# Patient Record
Sex: FEMALE | Race: WHITE | NOT HISPANIC OR LATINO
[De-identification: names, ages, dates, MRNs, and addresses within clinical notes are randomized per-mention and may not be internally consistent; named-entity substitution may affect disease eponyms.]

---

## 2017-04-04 ENCOUNTER — APPOINTMENT (OUTPATIENT)
Dept: MAMMOGRAPHY | Facility: IMAGING CENTER | Age: 42
End: 2017-04-04

## 2017-04-04 ENCOUNTER — OUTPATIENT (OUTPATIENT)
Dept: OUTPATIENT SERVICES | Facility: HOSPITAL | Age: 42
LOS: 1 days | End: 2017-04-04
Payer: COMMERCIAL

## 2017-04-04 ENCOUNTER — APPOINTMENT (OUTPATIENT)
Dept: ULTRASOUND IMAGING | Facility: IMAGING CENTER | Age: 42
End: 2017-04-04

## 2017-04-04 DIAGNOSIS — Z00.8 ENCOUNTER FOR OTHER GENERAL EXAMINATION: ICD-10-CM

## 2017-04-04 PROCEDURE — 76642 ULTRASOUND BREAST LIMITED: CPT

## 2017-05-10 ENCOUNTER — APPOINTMENT (OUTPATIENT)
Dept: NEUROLOGY | Facility: CLINIC | Age: 42
End: 2017-05-10

## 2017-05-10 VITALS
HEART RATE: 92 BPM | WEIGHT: 150 LBS | DIASTOLIC BLOOD PRESSURE: 62 MMHG | HEIGHT: 66 IN | BODY MASS INDEX: 24.11 KG/M2 | SYSTOLIC BLOOD PRESSURE: 106 MMHG

## 2017-05-10 DIAGNOSIS — M54.2 CERVICALGIA: ICD-10-CM

## 2017-05-10 DIAGNOSIS — Z78.9 OTHER SPECIFIED HEALTH STATUS: ICD-10-CM

## 2017-05-10 DIAGNOSIS — R20.2 PARESTHESIA OF SKIN: ICD-10-CM

## 2017-05-10 RX ORDER — PSYLLIUM HUSK 0.4 G
CAPSULE ORAL
Refills: 0 | Status: ACTIVE | COMMUNITY

## 2017-10-19 ENCOUNTER — LABORATORY RESULT (OUTPATIENT)
Age: 42
End: 2017-10-19

## 2017-10-19 ENCOUNTER — APPOINTMENT (OUTPATIENT)
Dept: ENDOCRINOLOGY | Facility: CLINIC | Age: 42
End: 2017-10-19
Payer: MEDICAID

## 2017-10-19 VITALS
OXYGEN SATURATION: 98 % | SYSTOLIC BLOOD PRESSURE: 102 MMHG | DIASTOLIC BLOOD PRESSURE: 72 MMHG | HEART RATE: 99 BPM | HEIGHT: 66 IN | BODY MASS INDEX: 20.73 KG/M2 | WEIGHT: 129 LBS

## 2017-10-19 PROCEDURE — 99214 OFFICE O/P EST MOD 30 MIN: CPT

## 2017-10-20 LAB
T3RU NFR SERPL: 0.94 INDEX
T4 SERPL-MCNC: 11.1 UG/DL
THYROGLOB AB SERPL-ACNC: <20 IU/ML
THYROGLOB SERPL-MCNC: <0.2 NG/ML
TSH SERPL-ACNC: 0.02 UIU/ML

## 2017-10-26 ENCOUNTER — RX RENEWAL (OUTPATIENT)
Age: 42
End: 2017-10-26

## 2018-11-19 ENCOUNTER — RX RENEWAL (OUTPATIENT)
Age: 43
End: 2018-11-19

## 2019-02-15 ENCOUNTER — RX RENEWAL (OUTPATIENT)
Age: 44
End: 2019-02-15

## 2019-03-07 ENCOUNTER — APPOINTMENT (OUTPATIENT)
Dept: ENDOCRINOLOGY | Facility: CLINIC | Age: 44
End: 2019-03-07

## 2019-08-12 ENCOUNTER — RX RENEWAL (OUTPATIENT)
Age: 44
End: 2019-08-12

## 2019-08-15 ENCOUNTER — RX RENEWAL (OUTPATIENT)
Age: 44
End: 2019-08-15

## 2019-08-16 ENCOUNTER — RX RENEWAL (OUTPATIENT)
Age: 44
End: 2019-08-16

## 2020-02-12 ENCOUNTER — RX RENEWAL (OUTPATIENT)
Age: 45
End: 2020-02-12

## 2020-02-21 ENCOUNTER — LABORATORY RESULT (OUTPATIENT)
Age: 45
End: 2020-02-21

## 2020-02-21 ENCOUNTER — APPOINTMENT (OUTPATIENT)
Dept: ENDOCRINOLOGY | Facility: CLINIC | Age: 45
End: 2020-02-21
Payer: MEDICAID

## 2020-02-21 VITALS
HEIGHT: 66 IN | SYSTOLIC BLOOD PRESSURE: 110 MMHG | HEART RATE: 64 BPM | OXYGEN SATURATION: 98 % | WEIGHT: 160 LBS | DIASTOLIC BLOOD PRESSURE: 90 MMHG | BODY MASS INDEX: 25.71 KG/M2

## 2020-02-21 DIAGNOSIS — N95.1 MENOPAUSAL AND FEMALE CLIMACTERIC STATES: ICD-10-CM

## 2020-02-21 PROCEDURE — 99214 OFFICE O/P EST MOD 30 MIN: CPT

## 2020-02-21 NOTE — HISTORY OF PRESENT ILLNESS
[FreeTextEntry1] : f/u 44 year-old female w/ thyroid disease. \par \par The patient was  diagnosed with papillary thyroid carcinoma in 1997. Pathology report 3.5 cm papillary thyroid cancer, no significant invasion. No lymph nodes. She was treated w/ radioactive iodine and followed by an endocrinologist in the city. Her last whole body scan was 8 years ago which was negative. She has been followed by endocrinology at [facility], Dr Dawn. She was originally on Synthroid 175 apparently with suppression of TSH. Later decreased to Synthroid 150 for low TSH. No local neck pain. No dysphagia or dysphonia. No raciness, shakiness, heat/cold intolerance, tiredness, or fatigue. No palpitations, tremors, or sudden weight gain/loss.\par  \par She has been told of low calcium the past but has not been treated specifically as a patient with surgical hypoparathyroidism. No tetany or paresthesias.\par \par No heart, lung, kidney, liver, stomach problems. No neurological or psychological problems. No ulcers or bleeding. Up to date with mammography and GYN.\par \par Pt reports she is entering menopause her last menses was in 11/2019, has some occasional night sweats and insomnia, no hot flashes.\par \par H/o infertility not currently attempting IVF.\par \par H/o syncope.

## 2020-02-21 NOTE — PHYSICAL EXAM
[No Acute Distress] : no acute distress [Alert] : alert [Well Nourished] : well nourished [Well Developed] : well developed [Normal Sclera/Conjunctiva] : normal sclera/conjunctiva [EOMI] : extra ocular movement intact [No Proptosis] : no proptosis [Well Healed Scar] : well healed scar [Normal Oropharynx] : the oropharynx was normal [No Accessory Muscle Use] : no accessory muscle use [No Respiratory Distress] : no respiratory distress [Clear to Auscultation] : lungs were clear to auscultation bilaterally [Normal S1, S2] : normal S1 and S2 [Normal Rate] : heart rate was normal  [Regular Rhythm] : with a regular rhythm [No Edema] : there was no peripheral edema [Not Tender] : non-tender [Normal Bowel Sounds] : normal bowel sounds [Soft] : abdomen soft [Not Distended] : not distended [Anterior Cervical Nodes] : anterior cervical nodes [Post Cervical Nodes] : posterior cervical nodes [Normal] : normal and non tender [No Spinal Tenderness] : no spinal tenderness [Spine Straight] : spine straight [No Stigmata of Cushings Syndrome] : no stigmata of cushings syndrome [Normal Gait] : normal gait [Normal Strength/Tone] : muscle strength and tone were normal [No Rash] : no rash [Normal Reflexes] : deep tendon reflexes were 2+ and symmetric [Oriented x3] : oriented to person, place, and time [Acanthosis Nigricans] : no acanthosis nigricans [de-identified] : fine tremor [de-identified] : well healed scar, no palpable nodes

## 2020-02-21 NOTE — END OF VISIT
[FreeTextEntry3] : I, Lobo Keating, authored this note working as a medical scribe for Dr. Amaral.  02/21/2020. 11:15AM. This note was authored by the medical scribe for me. I have reviewed, edited, and revised the note as needed. I am in agreement with the exam findings, imaging findings, and treatment plan.  Leandro Amaral MD

## 2020-02-21 NOTE — ASSESSMENT
[Levothyroxine] : The patient was instructed to take Levothyroxine on an empty stomach, separate from vitamins, and wait at least 30 minutes before eating [FreeTextEntry1] : 44 year-old female history of papillary thyroid cancer 1997.\par \par The patient was diagnosed with papillary thyroid carcinoma in 1997. Pathology report 3.5 cm papillary thyroid cancer, no significant invasion. No lymph nodes. She was treated w/ radioactive iodine and followed by an endocrinologist in the city. On Synthroid 150 mcg. No local neck pain. No dysphagia or dysphonia. No raciness, shakiness, heat/cold intolerance, tiredness, or fatigue. No palpitations, tremors, or sudden weight gain/loss.\par \par Pt has been told of low calcium in the past but has not been treated specifically as a patient with surgical hypoparathyroidism. No tetany or paresthesias.\par \par Pt reports she is entering menopause her last menses was in 11/2019, has some occasional night sweats and insomnia, no hot flashes. Pt can consider estrogen HRT if her symptoms worsen. Pt can also consider non-estrogen rx including low dose Effexor and Paxil for management of menopause symptoms. Risks and benefits discussed. All questions were answered. Rx information handout provided. Pt will f/u w/ GYN.\par \par Request labs sent out.\par \par f/u in 1 year

## 2020-02-22 LAB
25(OH)D3 SERPL-MCNC: 32.7 NG/ML
ALBUMIN SERPL ELPH-MCNC: 4.7 G/DL
ALP BLD-CCNC: 70 U/L
ALT SERPL-CCNC: 17 U/L
ANION GAP SERPL CALC-SCNC: 13 MMOL/L
AST SERPL-CCNC: 20 U/L
BILIRUB SERPL-MCNC: 1 MG/DL
BUN SERPL-MCNC: 14 MG/DL
CALCIUM SERPL-MCNC: 8.7 MG/DL
CALCIUM SERPL-MCNC: 8.7 MG/DL
CHLORIDE SERPL-SCNC: 100 MMOL/L
CO2 SERPL-SCNC: 28 MMOL/L
CREAT SERPL-MCNC: 0.85 MG/DL
ESTRADIOL SERPL-MCNC: 75 PG/ML
FSH SERPL-MCNC: 72.3 IU/L
GLUCOSE SERPL-MCNC: 88 MG/DL
LH SERPL-ACNC: 50.4 IU/L
PARATHYROID HORMONE INTACT: 21 PG/ML
PHOSPHATE SERPL-MCNC: 4.6 MG/DL
POTASSIUM SERPL-SCNC: 4.1 MMOL/L
PROT SERPL-MCNC: 6.9 G/DL
SODIUM SERPL-SCNC: 141 MMOL/L
T3RU NFR SERPL: 1.1 TBI
T4 SERPL-MCNC: 7.4 UG/DL
THYROGLOB AB SERPL-ACNC: <20 IU/ML
THYROGLOB SERPL-MCNC: <0.2 NG/ML
TSH SERPL-ACNC: 1.34 UIU/ML

## 2020-03-18 ENCOUNTER — RX RENEWAL (OUTPATIENT)
Age: 45
End: 2020-03-18

## 2021-06-24 ENCOUNTER — APPOINTMENT (OUTPATIENT)
Dept: ENDOCRINOLOGY | Facility: CLINIC | Age: 46
End: 2021-06-24

## 2022-01-10 ENCOUNTER — RX RENEWAL (OUTPATIENT)
Age: 47
End: 2022-01-10

## 2022-03-18 ENCOUNTER — APPOINTMENT (OUTPATIENT)
Dept: ENDOCRINOLOGY | Facility: CLINIC | Age: 47
End: 2022-03-18

## 2023-01-03 ENCOUNTER — RX RENEWAL (OUTPATIENT)
Age: 48
End: 2023-01-03

## 2023-01-17 ENCOUNTER — LABORATORY RESULT (OUTPATIENT)
Age: 48
End: 2023-01-17

## 2023-01-17 ENCOUNTER — APPOINTMENT (OUTPATIENT)
Dept: ENDOCRINOLOGY | Facility: CLINIC | Age: 48
End: 2023-01-17
Payer: MEDICAID

## 2023-01-17 VITALS
OXYGEN SATURATION: 97 % | HEIGHT: 66 IN | SYSTOLIC BLOOD PRESSURE: 118 MMHG | HEART RATE: 71 BPM | WEIGHT: 148 LBS | RESPIRATION RATE: 14 BRPM | DIASTOLIC BLOOD PRESSURE: 64 MMHG | BODY MASS INDEX: 23.78 KG/M2

## 2023-01-17 PROCEDURE — 99214 OFFICE O/P EST MOD 30 MIN: CPT

## 2023-01-18 LAB
ALBUMIN SERPL ELPH-MCNC: 4.4 G/DL
ALP BLD-CCNC: 95 U/L
ALT SERPL-CCNC: 37 U/L
ANION GAP SERPL CALC-SCNC: 12 MMOL/L
AST SERPL-CCNC: 36 U/L
BILIRUB SERPL-MCNC: 0.4 MG/DL
BUN SERPL-MCNC: 18 MG/DL
CALCIUM SERPL-MCNC: 8 MG/DL
CALCIUM SERPL-MCNC: 8 MG/DL
CHLORIDE SERPL-SCNC: 103 MMOL/L
CO2 SERPL-SCNC: 27 MMOL/L
CREAT SERPL-MCNC: 0.8 MG/DL
EGFR: 91 ML/MIN/1.73M2
GLUCOSE SERPL-MCNC: 97 MG/DL
PARATHYROID HORMONE INTACT: 25 PG/ML
PHOSPHATE SERPL-MCNC: 4.1 MG/DL
POTASSIUM SERPL-SCNC: 4.7 MMOL/L
PROT SERPL-MCNC: 6.7 G/DL
SODIUM SERPL-SCNC: 142 MMOL/L
T3RU NFR SERPL: 1.1 TBI
T4 SERPL-MCNC: 5.9 UG/DL
THYROGLOB AB SERPL-ACNC: <20 IU/ML
THYROGLOB SERPL-MCNC: <0.2 NG/ML
TSH SERPL-ACNC: 7.15 UIU/ML

## 2023-01-18 NOTE — ASSESSMENT
[Levothyroxine] : The patient was instructed to take Levothyroxine on an empty stomach, separate from vitamins, and wait at least 30 minutes before eating [FreeTextEntry1] : 47 year-old female history of papillary thyroid cancer 1997.\par \par The patient was diagnosed with papillary thyroid carcinoma in 1997. Pathology report 3.5 cm papillary thyroid cancer, no significant invasion. No lymph nodes. She was treated w/ radioactive iodine and followed by an endocrinologist in the city. On Synthroid 137 mcg.  But has not been taking medication regularly.  Recent TSH elevated.  Physical examination unremarkable.\par Will repeat thyroid function tests including thyroglobulin test which will function as a stimulated thyroglobulin.  Patient strongly advised of need for regular long-term dosing of thyroid hormone.\par \par  No local neck pain. No dysphagia or dysphonia. No raciness, shakiness, heat/cold intolerance, tiredness, or fatigue. No palpitations, tremors, or sudden weight gain/loss.\par \par Pt has been told of low calcium in the past but has not been treated specifically as a patient with surgical hypoparathyroidism. 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  Positive family history mother breast cancer also tested positive.  Recommend patient see genetic counselor for interpretation of this result. \par \par Labs 12/22\par TSH 36.4 elevated \par FSH 99\par LH 50.7 \par \par \par F/u in 1 year

## 2023-01-18 NOTE — HISTORY OF PRESENT ILLNESS
[FreeTextEntry1] :  Late follow-up.  \par \par The patient was  diagnosed with papillary thyroid carcinoma in 1997. Pathology report 3.5 cm papillary thyroid cancer, no significant invasion. No lymph nodes. She was treated w/ radioactive iodine and followed by an endocrinologist in the city. Her last whole body scan was 8 years ago which was negative. She has been followed by endocrinology at [facility], Dr Dawn. She was originally on Synthroid 175 apparently with suppression of TSH. Later decreased to Synthroid 137  for low TSH.\par The patient has been very inconsistent with recent medication dosing skipping most days of Synthroid.  Recent TSH elevated at 36.  Despite this patient has no overt hypothyroid symptoms.\par  No local neck pain. \par  \par She has been told of low calcium the past but has not been treated specifically as a patient with surgical hypoparathyroidism. No tetany or paresthesias.\par Pt reports that she is now in menopause. Periods stopped about 3 years ago. 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 \par No heart, lung, kidney, liver, stomach problems. No neurological or psychological problems. No ulcers or bleeding. Up to date with mammography and GYN..\par

## 2024-02-08 ENCOUNTER — RX RENEWAL (OUTPATIENT)
Age: 49
End: 2024-02-08

## 2024-03-08 ENCOUNTER — TRANSCRIPTION ENCOUNTER (OUTPATIENT)
Age: 49
End: 2024-03-08

## 2024-03-08 ENCOUNTER — APPOINTMENT (OUTPATIENT)
Dept: ENDOCRINOLOGY | Facility: CLINIC | Age: 49
End: 2024-03-08
Payer: MEDICAID

## 2024-03-08 VITALS
HEART RATE: 78 BPM | BODY MASS INDEX: 25.07 KG/M2 | WEIGHT: 156 LBS | OXYGEN SATURATION: 98 % | HEIGHT: 66 IN | SYSTOLIC BLOOD PRESSURE: 124 MMHG | DIASTOLIC BLOOD PRESSURE: 80 MMHG

## 2024-03-08 DIAGNOSIS — E20.9 HYPOPARATHYROIDISM, UNSPECIFIED: ICD-10-CM

## 2024-03-08 DIAGNOSIS — E89.0 POSTPROCEDURAL HYPOTHYROIDISM: ICD-10-CM

## 2024-03-08 DIAGNOSIS — Z91.89 OTHER SPECIFIED PERSONAL RISK FACTORS, NOT ELSEWHERE CLASSIFIED: ICD-10-CM

## 2024-03-08 DIAGNOSIS — C73 MALIGNANT NEOPLASM OF THYROID GLAND: ICD-10-CM

## 2024-03-08 PROCEDURE — 99214 OFFICE O/P EST MOD 30 MIN: CPT

## 2024-03-09 LAB
25(OH)D3 SERPL-MCNC: 40.4 NG/ML
ALBUMIN SERPL ELPH-MCNC: 4.8 G/DL
ALP BLD-CCNC: 90 U/L
ALT SERPL-CCNC: 24 U/L
ANION GAP SERPL CALC-SCNC: 15 MMOL/L
AST SERPL-CCNC: 26 U/L
BILIRUB SERPL-MCNC: 0.8 MG/DL
BUN SERPL-MCNC: 18 MG/DL
CALCIUM SERPL-MCNC: 8.7 MG/DL
CALCIUM SERPL-MCNC: 8.7 MG/DL
CHLORIDE SERPL-SCNC: 103 MMOL/L
CO2 SERPL-SCNC: 25 MMOL/L
CREAT SERPL-MCNC: 0.85 MG/DL
EGFR: 84 ML/MIN/1.73M2
GLUCOSE SERPL-MCNC: 94 MG/DL
HCT VFR BLD CALC: 39.9 %
HGB BLD-MCNC: 13.2 G/DL
MCHC RBC-ENTMCNC: 29 PG
MCHC RBC-ENTMCNC: 33.1 GM/DL
MCV RBC AUTO: 87.7 FL
PARATHYROID HORMONE INTACT: 21 PG/ML
PHOSPHATE SERPL-MCNC: 4.8 MG/DL
PLATELET # BLD AUTO: 261 K/UL
POTASSIUM SERPL-SCNC: 5 MMOL/L
PROT SERPL-MCNC: 6.8 G/DL
RBC # BLD: 4.55 M/UL
RBC # FLD: 13.4 %
SODIUM SERPL-SCNC: 143 MMOL/L
T4 FREE SERPL-MCNC: 1.4 NG/DL
TSH SERPL-ACNC: 11 UIU/ML
WBC # FLD AUTO: 6.1 K/UL

## 2024-03-09 NOTE — ASSESSMENT
[Levothyroxine] : The patient was instructed to take Levothyroxine on an empty stomach, separate from vitamins, and wait at least 30 minutes before eating [FreeTextEntry1] : 47 y/o female history of papillary thyroid cancer 1997.  The patient was diagnosed with papillary thyroid carcinoma in 1997. Pathology report 3.5 cm papillary thyroid cancer, no significant invasion. No lymph nodes. She was treated w/ radioactive iodine and followed by an endocrinologist in the city. On Synthroid 137 mcg. Recent TSH elevated. TSH 01/2023 was 7.15. Physical examination unremarkable. Will repeat thyroid function tests including thyroglobulin.  Patient strongly advised of need for regular long-term dosing of thyroid hormone.  No local neck pain. No dysphagia or dysphonia. No raciness, shakiness, heat/cold intolerance, tiredness, or fatigue. No palpitations, tremors, or sudden weight gain/loss.  Pt has been told of low calcium in the past but has not been treated specifically as a patient with surgical hypoparathyroidism. No tetany or paresthesias.   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  Positive family history mother breast cancer also tested positive.  Recommend patient see genetic counselor for interpretation of this result.   Labs: January 2023 Total T4: 5.9 PTH: 25 TSH: 7.15 (elevated)  Request labs sent out.   F/u in 1 year

## 2024-03-09 NOTE — HISTORY OF PRESENT ILLNESS
[FreeTextEntry1] : Pt returns for a follow-up visit for thyroid cancer. No interval health changes. No interval surgeries, hospitalizations, or changes in medication.   The patient was diagnosed with papillary thyroid carcinoma in 1997. Pathology report 3.5 cm papillary thyroid cancer, no significant invasion. No lymph nodes. She was treated w/ radioactive iodine and followed by an endocrinologist in the city. Her last whole-body scan was in 2017 which was negative. She has been followed by endocrinology at [facility], Dr Dawn. She was originally on Synthroid 175 apparently with suppression of TSH. Later decreased to Synthroid 137 for low TSH. Patient denies any symptoms of hypo or hyperthyroidism.  Patient denies any local neck discomfort.  No local neck pain.    She has been told of low calcium the past but has not been treated specifically as a patient with surgical hypoparathyroidism. No tetany or paresthesias. Pt reports that she is now in menopause. Periods stopped about 3 years ago. 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  No heart, lung, kidney, liver, stomach problems. No neurological or psychological problems. No ulcers or bleeding. Up to date with mammography and GYN..

## 2024-03-09 NOTE — END OF VISIT
[FreeTextEntry3] :  This note was written by Katharine Ogden on (March 7, 2024) acting as a medical scribe for Dr. Amaral This note was authored by the medical scribe for me. I have reviewed, edited, and revised the note as needed. I am in agreement with the exam findings, imaging findings, and treatment plan.  Leandro Amaral MD

## 2024-03-09 NOTE — PHYSICAL EXAM
[Alert] : alert [No Acute Distress] : no acute distress [Well Nourished] : well nourished [Normal Sclera/Conjunctiva] : normal sclera/conjunctiva [Well Developed] : well developed [EOMI] : extra ocular movement intact [No Proptosis] : no proptosis [Normal Oropharynx] : the oropharynx was normal [Thyroid Not Enlarged] : the thyroid was not enlarged [Well Healed Scar] : well healed scar [No Thyroid Nodules] : no palpable thyroid nodules [No Accessory Muscle Use] : no accessory muscle use [No Respiratory Distress] : no respiratory distress [Clear to Auscultation] : lungs were clear to auscultation bilaterally [Normal Rate] : heart rate was normal [Normal S1, S2] : normal S1 and S2 [No Edema] : no peripheral edema [Regular Rhythm] : with a regular rhythm [Normal Bowel Sounds] : normal bowel sounds [Not Tender] : non-tender [Not Distended] : not distended [Soft] : abdomen soft [Normal Anterior Cervical Nodes] : no anterior cervical lymphadenopathy [No Spinal Tenderness] : no spinal tenderness [Spine Straight] : spine straight [Normal Gait] : normal gait [No Stigmata of Cushings Syndrome] : no stigmata of Cushings Syndrome [Normal Strength/Tone] : muscle strength and tone were normal [No Rash] : no rash [Normal Reflexes] : deep tendon reflexes were 2+ and symmetric [Oriented x3] : oriented to person, place, and time [No Tremors] : no tremors [Acanthosis Nigricans] : no acanthosis nigricans

## 2024-03-11 LAB
THYROGLOB AB SERPL-ACNC: <20 IU/ML
THYROGLOB SERPL-MCNC: <0.2 NG/ML

## 2024-03-15 ENCOUNTER — TRANSCRIPTION ENCOUNTER (OUTPATIENT)
Age: 49
End: 2024-03-15

## 2025-03-13 ENCOUNTER — RX RENEWAL (OUTPATIENT)
Age: 50
End: 2025-03-13

## 2025-06-23 ENCOUNTER — RX RENEWAL (OUTPATIENT)
Age: 50
End: 2025-06-23

## 2025-07-21 ENCOUNTER — RX RENEWAL (OUTPATIENT)
Age: 50
End: 2025-07-21